# Patient Record
Sex: FEMALE | Race: ASIAN | URBAN - METROPOLITAN AREA
[De-identification: names, ages, dates, MRNs, and addresses within clinical notes are randomized per-mention and may not be internally consistent; named-entity substitution may affect disease eponyms.]

---

## 2017-07-25 ENCOUNTER — HISTORICAL (OUTPATIENT)
Dept: LAB | Facility: HOSPITAL | Age: 77
End: 2017-07-25

## 2017-07-31 ENCOUNTER — HISTORICAL (OUTPATIENT)
Dept: LAB | Facility: HOSPITAL | Age: 77
End: 2017-07-31

## 2017-11-13 ENCOUNTER — HISTORICAL (OUTPATIENT)
Dept: RADIOLOGY | Facility: HOSPITAL | Age: 77
End: 2017-11-13

## 2017-11-16 ENCOUNTER — HISTORICAL (OUTPATIENT)
Dept: RESPIRATORY THERAPY | Facility: HOSPITAL | Age: 77
End: 2017-11-16

## 2017-12-05 ENCOUNTER — HISTORICAL (OUTPATIENT)
Dept: RADIOLOGY | Facility: HOSPITAL | Age: 77
End: 2017-12-05

## 2018-02-07 ENCOUNTER — HISTORICAL (OUTPATIENT)
Dept: LAB | Facility: HOSPITAL | Age: 78
End: 2018-02-07

## 2018-05-07 ENCOUNTER — HISTORICAL (OUTPATIENT)
Dept: LAB | Facility: HOSPITAL | Age: 78
End: 2018-05-07

## 2019-02-08 ENCOUNTER — HISTORICAL (OUTPATIENT)
Dept: LAB | Facility: HOSPITAL | Age: 79
End: 2019-02-08

## 2019-09-06 ENCOUNTER — HISTORICAL (OUTPATIENT)
Dept: LAB | Facility: HOSPITAL | Age: 79
End: 2019-09-06

## 2019-11-13 ENCOUNTER — HISTORICAL (OUTPATIENT)
Dept: ADMINISTRATIVE | Facility: HOSPITAL | Age: 79
End: 2019-11-13

## 2019-11-13 LAB
ABS NEUT (OLG): 4.47 X10(3)/MCL (ref 2.1–9.2)
BASOPHILS # BLD AUTO: 0 X10(3)/MCL (ref 0–0.2)
BASOPHILS NFR BLD AUTO: 0 %
BUN SERPL-MCNC: 21 MG/DL (ref 7–18)
CALCIUM SERPL-MCNC: 8.9 MG/DL (ref 8.5–10.1)
CHLORIDE SERPL-SCNC: 105 MMOL/L (ref 98–107)
CO2 SERPL-SCNC: 29 MMOL/L (ref 21–32)
CREAT SERPL-MCNC: 1.46 MG/DL (ref 0.55–1.02)
CREAT/UREA NIT SERPL: 14.4
EOSINOPHIL # BLD AUTO: 0.2 X10(3)/MCL (ref 0–0.9)
EOSINOPHIL NFR BLD AUTO: 4 %
ERYTHROCYTE [DISTWIDTH] IN BLOOD BY AUTOMATED COUNT: 15.9 % (ref 11.5–17)
EST. AVERAGE GLUCOSE BLD GHB EST-MCNC: 148 MG/DL
GLUCOSE SERPL-MCNC: 144 MG/DL (ref 74–106)
HBA1C MFR BLD: 6.8 % (ref 4.2–6.3)
HCT VFR BLD AUTO: 31 % (ref 37–47)
HGB BLD-MCNC: 8.8 GM/DL (ref 12–16)
LYMPHOCYTES # BLD AUTO: 1 X10(3)/MCL (ref 0.6–4.6)
LYMPHOCYTES NFR BLD AUTO: 16 %
MCH RBC QN AUTO: 25.4 PG (ref 27–31)
MCHC RBC AUTO-ENTMCNC: 28.4 GM/DL (ref 33–36)
MCV RBC AUTO: 89.3 FL (ref 80–94)
MONOCYTES # BLD AUTO: 0.4 X10(3)/MCL (ref 0.1–1.3)
MONOCYTES NFR BLD AUTO: 6 %
NEUTROPHILS # BLD AUTO: 4.47 X10(3)/MCL (ref 2.1–9.2)
NEUTROPHILS NFR BLD AUTO: 73 %
PLATELET # BLD AUTO: 169 X10(3)/MCL (ref 130–400)
PMV BLD AUTO: 10 FL (ref 9.4–12.4)
POTASSIUM SERPL-SCNC: 4.3 MMOL/L (ref 3.5–5.1)
RBC # BLD AUTO: 3.47 X10(6)/MCL (ref 4.2–5.4)
SODIUM SERPL-SCNC: 142 MMOL/L (ref 136–145)
TSH SERPL-ACNC: 1.42 MIU/L (ref 0.36–3.74)
URATE SERPL-MCNC: 4.7 MG/DL (ref 2.6–7.2)
WBC # SPEC AUTO: 6.1 X10(3)/MCL (ref 4.5–11.5)

## 2019-11-19 ENCOUNTER — HISTORICAL (OUTPATIENT)
Dept: ADMINISTRATIVE | Facility: HOSPITAL | Age: 79
End: 2019-11-19

## 2019-11-19 LAB
BUN SERPL-MCNC: 23 MG/DL (ref 7–18)
CALCIUM SERPL-MCNC: 9.1 MG/DL (ref 8.5–10.1)
CHLORIDE SERPL-SCNC: 104 MMOL/L (ref 98–107)
CO2 SERPL-SCNC: 28 MMOL/L (ref 21–32)
CREAT SERPL-MCNC: 1.16 MG/DL (ref 0.55–1.02)
CREAT/UREA NIT SERPL: 19.8
GLUCOSE SERPL-MCNC: 188 MG/DL (ref 74–106)
POTASSIUM SERPL-SCNC: 4.5 MMOL/L (ref 3.5–5.1)
SODIUM SERPL-SCNC: 140 MMOL/L (ref 136–145)

## 2020-02-19 ENCOUNTER — HISTORICAL (OUTPATIENT)
Dept: ADMINISTRATIVE | Facility: HOSPITAL | Age: 80
End: 2020-02-19

## 2020-03-13 ENCOUNTER — HISTORICAL (OUTPATIENT)
Dept: ADMINISTRATIVE | Facility: HOSPITAL | Age: 80
End: 2020-03-13

## 2020-03-19 LAB
FINAL CULTURE: NORMAL
GRAM STN SPEC: NORMAL

## 2020-04-01 ENCOUNTER — HISTORICAL (OUTPATIENT)
Dept: SURGERY | Facility: HOSPITAL | Age: 80
End: 2020-04-01

## 2022-04-10 ENCOUNTER — HISTORICAL (OUTPATIENT)
Dept: ADMINISTRATIVE | Facility: HOSPITAL | Age: 82
End: 2022-04-10

## 2022-04-29 VITALS
DIASTOLIC BLOOD PRESSURE: 58 MMHG | HEIGHT: 62 IN | WEIGHT: 201 LBS | OXYGEN SATURATION: 73 % | BODY MASS INDEX: 36.99 KG/M2 | SYSTOLIC BLOOD PRESSURE: 160 MMHG

## 2022-04-30 NOTE — OP NOTE
Patient:   Enedina Man            MRN: [id number]            FIN: [id number]               Age:   79 years     Sex:  Female     :  1940   Associated Diagnoses:   Malnutrition; Metabolic encephalopathy; Morbid obesity; Emphysema/COPD   Author:   Eldon Jama MD      Operative Note   Operative Information   Date/ Time:  2020 09:50:00.     Procedures Performed: PEG tube insertion   EGD.     Indications: respiratory failure, malnutrition  unable to eat.     Preoperative Diagnosis: Malnutrition (ZWP97-BN E63.9), Metabolic encephalopathy (NWY62-VG G93.41), Morbid obesity (VGS21-CE E66.01), Emphysema/COPD (TUM20-ZF J43.9).     Postoperative Diagnosis: Malnutrition (ZZU79-EX E63.9), Metabolic encephalopathy (QQX24-XV G93.41), Morbid obesity (JQJ11-KQ E66.01), Emphysema/COPD (KJP62-DT J43.9).     Surgeon: Eldon Jama MD.     Anesthesia: local mac.     Speciman Removed: none.     Description of Procedure/Findings/    Complications:     patient was brought to OR and laid supine     abdomen was prepped and draped in the usual sterile fashion    The  pull Percutaneous endoscopic Gastrostomy tube was used for this case    endoscope  was used to insufflate the stomach,     threre were no gastric ulcers,    duodenum intubated, mild duodenitis    my assistant pressed on the abdominal wall to find where the stomach could be safely punctured via the endoscopic view    a #11 blade was used to make a 7mm skin incision    a large bore introducer needle and catheter was advanced through the incision and entered the lumen on the stomach as seen via gastroscope    a gentle guide wire was introduced into the stomach,     and endoscopic snare was used to grasp the guide wire    the guide wire was pulled from the stomach and out past the patients mouth    the cook PEG tube was passed through the patients mouth over the guide wire untill the hub of the peg tube was snug against the body of the stomach  and clearly seen on vie endoscopic view    the endoscope was removed and stomach deflated    patient then taken back to pacu in stable condition.     Findings: normal gastric anatomy    mild duodenitis    no gastric ulcers     .     Complications: None.

## 2022-04-30 NOTE — CONSULTS
DATE OF CONSULT:      We are called in consultation to see this 79-year-old, [place] female who is developing increase of urea nitrogen and creatinine.  The patient had been admitted with dyspnea and nonproductive cough.  Patient is known to have a significant degree of COPD and also history of chronic kidney disease stage 3, type 2 diabetes mellitus, and in this admission, she is admitted with a diagnosis of acute on chronic hypercapnic respiratory failure.  As above mentioned, we were called in consultation due to elevated BUN and creatinine.    ALLERGIES:  PATIENT HAS NO KNOWN ALLERGIES.     MEDICATIONS:  Medication list can be found in the medication section of the chart.    LABORATORY ANALYSIS:  Today showed a sodium 132, potassium 4.1, chloride 99, CO2 26.  Her blood sugar was 191, , creatinine 1.63.    PHYSICAL EXAMINATION:  GENERAL:  The patient is alert but she is poorly communicative and she could not sustain adequate conversation.  She does not recall or could not mention the name of a nephrologist following her for her kidney problems.   VITAL SIGNS:  Her blood pressure 150/81 with a heart rate of 66, which is regular now, respiration 18, temperature 37.1 degrees Celsius.   HEENT:  Pupils equal and reactive to light.   NECK:  Without jugular venous distention or masses.  She has a tracheostomy in place.   CHEST:  Essentially clear now.   HEART:  Regular rhythm.  There are traces of dependent edema on both thighs.     ABDOMEN:  Soft, nontender.   EXTREMITIES:  As above mentioned, some edema on her thighs.    ASSESSMENT:    1. Acute kidney injury superimposed on preexisting chronic kidney disease, which had been at stage 3.  The former possibly is due to renal hypoperfusion due to fluid losses in the presence of ARB which has been discontinued, to which we agree.  Acute kidney injury seems resolving now.  There is significant azotemia, likely associated to hypoperfusion and steroid use.    2. Hyponatremia, dilutional, seems to be resolving now.    3. Acute on chronic respiratory failure.  4. Chronic obstructive pulmonary disease, on ventilator with tracheostomy.  5. Type 2 diabetes mellitus.  6. Arterial hypertension.    PLAN:  We agree with the current treatment.  Avoid nonsteroidal anti-inflammatory medication as well as RAMOS-2 inhibitors.  Continue monitoring patient and repeat laboratory analysis tomorrow morning.     Thank you very much for allowing us to participate in the care of this lady.        ______________________________  MD JOAQUÍN Shaw/SALUD  DD:  04/03/2020  Time:  10:09AM  DT:  04/03/2020  Time:  10:25AM  Job #:  [id number]    cc: Dr. Tyson

## 2024-07-29 NOTE — DISCHARGE SUMMARY
Patient:   Enedina Man            MRN: [id number]            FIN: [id number]               Age:   79 years     Sex:  Female     :  1940   Associated Diagnoses:   None   Author:   Norberto Asif      Date of Service: 4/10/2020      Discharge Information      Discharge Summary Information   Date of Admission: 3/12/2020  Date of Discharge: 4/10/2020  Admit Diagnosis: Acute on chronic hypercapnic hypoxic respiratory failure         COPD stage III         Metapneu PNA         Physical deconditioning         ASHLEY on CKD stage IIIb         HTN         Hypothyroidism         Anxiety and depression         Constipation         Anemia         GERD  Discharge Diagnosis: Acute on chronic hypercapnic hypoxic respiratory failure (stable - ventilator dependent)           COPD stage III (current)           Metapneu PNA (resolved)           Physical deconditioning (improving)           ASHLEY on CKD stage IIIb (improving)           HTN (stable)            Hypothyroidism (stable)            Anxiety and depression (improving)           Constipation (stable)            Anemia (stable)            GERD (stable)            Hypernatremia (stable)            Dysphagia (current)           DM type II (stable)            Monoclonal gammopathy (stable)            Insomnia (stable)   Internal Medicine (attending): Guanaco Tyson MD  Pulmonology: Cezar Kelley MD    OUTPATIENT PROVIDERS  PCP: Pierre Powell MD      Hospital Course   79-year-old  female presented to [facility] ED on 3/1 2020 with dyspnea and nonproductive cough x2 days not relieved by DuoNeb treatments, bilateral lower extremity edema and chest pain exacerbated with coughing.  PMH significant for DM type II, COPD stage III (home O2 with trilogy at night), CKD stage III, hypothyroidism, and anxiety/depression.  Work-up significant for new onset HFpEF with recent echo 55% and proBNP 1473 on admission and acute on chronic hypercapnic  Body Location Override (Optional): Rt Malar Cheek Detail Level: Detailed respiratory failure..  TTE on 3/2 with LVEF 60-65%.  Diastolic dysfunction stage I.  Diuresis initiated.  Initially required 5 L oxygen mass but later required BiPAP to manage oxygenation needs.  Respiratory PCR significant for Metapneu on 3/3 requiring intubation secondary to failure to improve on BiPAP.  During the course of her stay she was unable to wean off of the ventilator and required percutaneous tracheostomy on 3/11 without perioperative complication.  She completed 10 days of Levaquin on 3/11 and has been weaned from IV steroids to oral prednisone with plan to wean over the next week.  Tolerated transfer to [facility] ([facility] LTAC inpatient unit on 3/12 without incident.    During inpatient LTAC course patient remained with NG tube for tube feedings.  As she began to wake up she did not understand why her family cannot come see her.  She had no idea about the coronavirus pandemic and was severely depressed.  She initially was able to tolerate trach collar during the day and ventilation at night, but throughout the course of her stay required ventilatory support 24/7.  Whenever we try to wean she would decompensate.  Discussed plan of care with family including hospice versus vent SNF placement.  Patient unable to tolerate bedside swallow and tolerated PEG placement on 4/1 without perioperative complications.  She began to tolerate tube feedings.  Her status remains very tenuous and initially was very depressed with poor motivation.  She was tested for COVID- 19 on 4/5 which was negative.  Nephrology consulted for elevated BUN which peaked at 108.  Discussed with family and her disciplinary team and decided patient was not a good candidate for dialysis.  Nephrology thought uremia likely secondary to renal hypoperfusion and steroid use.  Steroids discontinued on 4/5.  Uremia started to improve.  She began sitting up on the side of the bed with therapy on 4/5 with a dramatic improvement  Pathology Override (Pathology Will Render As Diagnosis Name If Left Blank): BCC in mood.  She began smiling and using her upper extremities to give a thumbs up.  She remains completely ventilatory dependent.  Discussed with family and they were unable to provide the resources needed to go home on ventilatory support.  The closest dilatory skilled facilities available [place] this [place].  Hospice and local nursing home agreed to take patient on trilogy.  MD did discuss CODE STATUS on 3/31 with FILOMENA Artis.  She was made a DNR.  Required 2 units PRBC on 4/9 secondary to gradual anemia from chronic disease no evidence of GI bleed.  H&H 9.6 and 30.9.  Reactive leukocytosis resolving.  Renal indices gradually improving.  Vital signs and CBGs remain at goal.  Med reconciliation completed.  Discharge orders initiated.  Stable for transfer to [facility] with hospice care.  To follow-up with hospice on admission.    Chief Complaint: Metapneu PNA, COPD stage III exacerbation and acute on chronic hypercapnic respiratory failure requiring tracheostomy on 3/11/2020       Physical Examination      Vital Signs (last 24 hrs)_____  Last Charted___________  Temp Oral         36.6 DegC  (APR 10 00:00)  Heart Rate Peripheral   64 bpm  (APR 09 09:30)  Resp Rate             14 br/min  (APR 10 00:00)  SBP      124 mmHg  (APR 10 00:00)  DBP      L 46mmHg  (APR 10 00:00)  SpO2      98 %  (APR 10 03:06)     General:  No acute distress.    Eye:  Vision unchanged.    HENT:  Normocephalic.    Neck:  Supple, Trach secure -on ventilatory support.    Respiratory:  Lungs are clear to auscultation, Respirations are non-labored, Breath sounds are equal, Symmetrical chest wall expansion, No chest wall tenderness.    Cardiovascular:  Regular rhythm, No murmur, Normal peripheral perfusion, No edema.    Gastrointestinal:  Soft, Non-tender, Normal bowel sounds.    Musculoskeletal:  Generalized weakness and muscle atrophy.    Integumentary:  Warm, Dry.    Neurologic:  Alert, Oriented, Cranial Nerves II-XII are  Field Number: 1 grossly intact.    Psychiatric:  Cooperative, Appropriate mood & affect.        Results Review   General results   Most recent results   Discrete results only   4/10/2020 3:30 CDT       WBC                       14.6 x10(3)/mcL  HI                             RBC                       3.26 x10(6)/mcL  LOW                             Hgb                       9.6 gm/dL  LOW                             Hct                       30.9 %  LOW                             Platelet                  97 x10(3)/mcL  LOW                             MCV                       94.8 fL  HI                             MCH                       29.4 pg                             MCHC                      31.1 gm/dL  LOW                             RDW                       17.5 %  HI                             MPV                       11.0 fL  HI                             Abs Neut                  13.46 x10(3)/mcL  HI                             Neutro Auto               92.1 %  HI                             Lymph Auto                3.2 %  LOW                             Mono Auto                 3.4 %  LOW                             Eos Auto                  0.6 %                             Abs Eos                   0.09 x10(3)/mcL                             Basophil Auto             0.1 %                             Abs Neutro                13.46 x10(3)/mcL  HI                             Abs Lymph                 0.47 x10(3)/mcL  LOW                             Abs Mono                  0.49 x10(3)/mcL                             Abs Baso                  0.01 x10(3)/mcL                             NRBC%                     0.0 %                             IG%                       0.600 %  HI                             IG#                       0.0900  HI           Discharge Plan   Discharge Summary Plan   Discharge Status: improved.        Location: Discharge to NH with Hospice     Medications: See discharge  Shield Size In Cm: 3 medicine reconciliation     Activity: as tolerated     Diet: ADA     Instructions:  Take all medications as prescribed.          Attend appointments as scheduled.          Return to ED if symptoms worsen, or if t > 100.4.     Education: Hypercapnic respiratory failure.  COPD.  DM type II.  Dysphagia.  Hypothyroidism.     Follow-up:  Follow-up with hospice on admission to long-term care facility    Discussed plan of care, and patient communicated understanding. Agreed to comply with recommendations.    Discharge Time: 52 minutes    Applicator Size In Cm: 4.0 cm Energy (Kv): 70 Treatment Time (Min): 0.41 Time, Dose, Fractionation Factor (Tdf) For Prescription 1: 100 Daily Dose (Cgy): 279.62 Number Of Fractions For Prescription 1: 20 Total Dose For Prescription 1 (Cgy): 5592.40 Add A Second Prescription?: No Additional Fraction(S) Needed:: 0 Bill For Physics Consultation: No - Render Text Only Physics Documentation: (Physics Check Verbiage)